# Patient Record
Sex: FEMALE | URBAN - METROPOLITAN AREA
[De-identification: names, ages, dates, MRNs, and addresses within clinical notes are randomized per-mention and may not be internally consistent; named-entity substitution may affect disease eponyms.]

---

## 2022-03-02 ENCOUNTER — APPOINTMENT (RX ONLY)
Dept: URBAN - METROPOLITAN AREA CLINIC 55 | Facility: CLINIC | Age: 72
Setting detail: DERMATOLOGY
End: 2022-03-02

## 2022-03-02 DIAGNOSIS — Z41.9 ENCOUNTER FOR PROCEDURE FOR PURPOSES OTHER THAN REMEDYING HEALTH STATE, UNSPECIFIED: ICD-10-CM

## 2022-03-02 PROCEDURE — ? DYSPORT

## 2022-03-02 PROCEDURE — ? FILLERS

## 2022-03-02 NOTE — PROCEDURE: FILLERS
Additional Area 5 Volume In Cc: 0
Lot #: [id number]
Lot #: [id number]
Lot #: [id number]
Include Cannula Information In Note?: No
Detail Level: Detailed
Consent: Written consent obtained. Risks include but not limited to bruising, beading, irregular texture, ulceration, infection, allergic reaction, scar formation, incomplete augmentation, temporary nature, procedural pain.
Price (Use Numbers Only, No Special Characters Or $): 639
Include Cannula Size?: 25G
Filler: Restylane Contour
Expiration Date (Month Year): 05/31/23
Post-Care Instructions: Patient instructed to apply ice to reduce swelling. Post care handout given to patient.
Include Cannula Information In Note?: Yes
Filler: Restylane-L
Expiration Date (Month Year): 2/28/2023
Filler: Restylane Kysse
Expiration Date (Month Year): 6/30/2023
Map Statment: See Attach Map for Complete Details
Additional Anesthesia Volume In Cc: 6
Anesthesia Type: 1% lidocaine with epinephrine
Expiration Date (Month Year): 6/30/2024
Include Cannula Size?: 27G
Lot #: [id number]
Lot #: [id number]
Anesthesia Volume In Cc: 0.3

## 2022-03-02 NOTE — PROCEDURE: DYSPORT
Show Periorbital Units: Yes
Right Pupillary Line Units: 0
Glabellar Complex Units: 30
Show Right And Left Brow Units: No
Consent: Written consent obtained. Risks include but not limited to lid/brow ptosis, bruising, swelling, diplopia, temporary effect, incomplete chemical denervation.
Lot #: [id number]
Forehead Units: 15
Expiration Date (Month Year): 11/30/2022
Post-Care Instructions: Patient instructed to not lie down for 4 hours and limit physical activity for 24 hours. Follow up 7 days if needed.
Dilution (U/0.1 Cc): 10
Price (Use Numbers Only, No Special Characters Or $): 216
Additional Area 1 Location: lip
Detail Level: Detailed

## 2022-11-29 ENCOUNTER — APPOINTMENT (RX ONLY)
Dept: URBAN - METROPOLITAN AREA CLINIC 55 | Facility: CLINIC | Age: 72
Setting detail: DERMATOLOGY
End: 2022-11-29

## 2022-11-29 DIAGNOSIS — Z41.9 ENCOUNTER FOR PROCEDURE FOR PURPOSES OTHER THAN REMEDYING HEALTH STATE, UNSPECIFIED: ICD-10-CM

## 2022-11-29 PROCEDURE — ? DYSPORT

## 2022-11-29 PROCEDURE — ? FILLERS

## 2022-11-29 PROCEDURE — ? INVENTORY

## 2022-11-29 NOTE — PROCEDURE: DYSPORT
Show Masseter Units: Yes
Post-Care Instructions: Patient instructed to not lie down flat for 4 hours or rub the treatment area.
Additional Area 2 Units: 0
Show Right And Left Periorbital Units: No
Dilution (U/0.1 Cc): 10
Additional Area 1 Location: upper cutaneous
Glabellar Complex Units: 30
Detail Level: Detailed
Forehead Units: 15
Consent obtained and risk reviewed.
Show Inventory Tab: Show

## 2022-11-29 NOTE — PROCEDURE: FILLERS
Nasolabial Folds Filler Volume In Cc: 0
Include Cannula Size?: 27G
Detail Level: Detailed
Include Cannula Information In Note?: No
Filler: RHA 1
Use Map Statement For Sites (Optional): Yes
Filler: Restylane Defyne
Map Statment: See Attach Map for Complete Details
Consent obtained and risks reviewed.
Aspiration Statement: Aspiration was performed prior to injecting site with filler.
Post-Care Instructions: Patient instructed to apply ice to reduce swelling.
Anesthesia Type: 1% lidocaine with epinephrine
Anesthesia Volume In Cc: 0.5
Additional Anesthesia Volume In Cc: 6